# Patient Record
Sex: MALE | Race: WHITE | NOT HISPANIC OR LATINO | URBAN - METROPOLITAN AREA
[De-identification: names, ages, dates, MRNs, and addresses within clinical notes are randomized per-mention and may not be internally consistent; named-entity substitution may affect disease eponyms.]

---

## 2022-03-17 ENCOUNTER — OFFICE (OUTPATIENT)
Dept: URBAN - METROPOLITAN AREA CLINIC 34 | Facility: CLINIC | Age: 72
End: 2022-03-17
Payer: COMMERCIAL

## 2022-03-17 VITALS
DIASTOLIC BLOOD PRESSURE: 86 MMHG | HEIGHT: 72 IN | HEART RATE: 66 BPM | SYSTOLIC BLOOD PRESSURE: 139 MMHG | TEMPERATURE: 97.8 F | WEIGHT: 213 LBS

## 2022-03-17 DIAGNOSIS — R13.10 DYSPHAGIA, UNSPECIFIED: ICD-10-CM

## 2022-03-17 PROCEDURE — 99203 OFFICE O/P NEW LOW 30 MIN: CPT

## 2022-03-17 NOTE — SERVICEHPINOTES
70 y/o male presents for evaluation of difficulty swallowing. Reports this has been an intermittent issue throughout his life. Recalls an episode at age 13 where had an almond become "lodged in the throat". He states he needed medical intervention to have it removed unclear what specific procedure. He states he had GI evaluation at around age 43 and he was "diagnosed with Schatzki ring" and had "surgery to fix it". He was under the impression that this would take care of the difficulty swallowing but it did not. States rice, specifically, seems to give him issues. Last episode a couple of months ago he had to induce vomiting and food came up with blood. He was evaluated in ER was told to see GI and start omeprazole 20 mg. Describes food getting stuck while pointing at suprasternal notch, seems to be pretty immediate after swallowing. No liquid dysphagia. Endorses acid reflux recently but not significant.
br No odynophagia, nausea, vomiting, abdominal pain. No fevers, chills, night sweats, unintentional weight loss.br

br +eczema as child, allergies to tree pollen &amp animal dander. no known food allergies.
br Colonosopy about 7 yrs ago was told he didn't need anymore (?)  Date may be wrong. He will bring records for review.br visited="true"

## 2022-03-24 ENCOUNTER — ON CAMPUS - OUTPATIENT (OUTPATIENT)
Dept: URBAN - METROPOLITAN AREA HOSPITAL 16 | Facility: HOSPITAL | Age: 72
End: 2022-03-24
Payer: COMMERCIAL

## 2022-03-24 DIAGNOSIS — K29.60 OTHER GASTRITIS WITHOUT BLEEDING: ICD-10-CM

## 2022-03-24 DIAGNOSIS — R13.10 DYSPHAGIA, UNSPECIFIED: ICD-10-CM

## 2022-03-24 PROCEDURE — 43248 EGD GUIDE WIRE INSERTION: CPT | Performed by: INTERNAL MEDICINE

## 2022-03-24 PROCEDURE — 43239 EGD BIOPSY SINGLE/MULTIPLE: CPT | Mod: 59 | Performed by: INTERNAL MEDICINE
